# Patient Record
Sex: MALE | Race: AMERICAN INDIAN OR ALASKA NATIVE
[De-identification: names, ages, dates, MRNs, and addresses within clinical notes are randomized per-mention and may not be internally consistent; named-entity substitution may affect disease eponyms.]

---

## 2017-02-02 ENCOUNTER — HOSPITAL ENCOUNTER (INPATIENT)
Dept: HOSPITAL 5 - ED | Age: 77
LOS: 2 days | Discharge: HOME | DRG: 391 | End: 2017-02-04
Attending: HOSPITALIST | Admitting: INTERNAL MEDICINE
Payer: MEDICARE

## 2017-02-02 DIAGNOSIS — I25.10: ICD-10-CM

## 2017-02-02 DIAGNOSIS — E78.5: ICD-10-CM

## 2017-02-02 DIAGNOSIS — N17.0: ICD-10-CM

## 2017-02-02 DIAGNOSIS — E87.5: ICD-10-CM

## 2017-02-02 DIAGNOSIS — Z83.3: ICD-10-CM

## 2017-02-02 DIAGNOSIS — K52.9: Primary | ICD-10-CM

## 2017-02-02 DIAGNOSIS — H54.7: ICD-10-CM

## 2017-02-02 DIAGNOSIS — I10: ICD-10-CM

## 2017-02-02 DIAGNOSIS — Z95.1: ICD-10-CM

## 2017-02-02 DIAGNOSIS — Z82.49: ICD-10-CM

## 2017-02-02 DIAGNOSIS — Z86.73: ICD-10-CM

## 2017-02-02 DIAGNOSIS — E11.65: ICD-10-CM

## 2017-02-02 LAB
ALBUMIN SERPL-MCNC: 4.4 G/DL (ref 3.9–5)
ALBUMIN/GLOB SERPL: 1.5 %
ALP SERPL-CCNC: 49 UNITS/L (ref 35–129)
ALT SERPL-CCNC: 19 UNITS/L (ref 7–56)
ANION GAP SERPL CALC-SCNC: 21 MMOL/L
BASOPHILS NFR BLD AUTO: 0.5 % (ref 0–1.8)
BILIRUB DIRECT SERPL-MCNC: < 0.2 MG/DL (ref 0–0.2)
BILIRUB INDIRECT SERPL-MCNC: 0.6 MG/DL
BILIRUB SERPL-MCNC: 0.8 MG/DL (ref 0.1–1.2)
BILIRUB UR QL STRIP: (no result)
BLOOD UR QL VISUAL: (no result)
BUN SERPL-MCNC: 33 MG/DL (ref 9–20)
BUN/CREAT SERPL: 20.62 %
CALCIUM SERPL-MCNC: 9.4 MG/DL (ref 8.4–10.2)
CHLORIDE SERPL-SCNC: 98.4 MMOL/L (ref 98–107)
CO2 SERPL-SCNC: 24 MMOL/L (ref 22–30)
EOSINOPHIL NFR BLD AUTO: 0.1 % (ref 0–4.3)
GLUCOSE SERPL-MCNC: 207 MG/DL (ref 75–100)
HCT VFR BLD CALC: 45.6 % (ref 35.5–45.6)
HGB BLD-MCNC: 14.9 GM/DL (ref 11.8–15.2)
KETONES UR STRIP-MCNC: (no result) MG/DL
LEUKOCYTE ESTERASE UR QL STRIP: (no result)
LIPASE SERPL-CCNC: 39 UNITS/L (ref 13–60)
MCH RBC QN AUTO: 27 PG (ref 28–32)
MCHC RBC AUTO-ENTMCNC: 33 % (ref 32–34)
MCV RBC AUTO: 83 FL (ref 84–94)
MUCOUS THREADS #/AREA URNS HPF: (no result) /HPF
NITRITE UR QL STRIP: (no result)
PH UR STRIP: 5 [PH] (ref 5–7)
PLATELET # BLD: 183 K/MM3 (ref 140–440)
POTASSIUM SERPL-SCNC: 5.4 MMOL/L (ref 3.6–5)
PROT SERPL-MCNC: 7.4 G/DL (ref 6.3–8.2)
PROT UR STRIP-MCNC: (no result) MG/DL
RBC # BLD AUTO: 5.52 M/MM3 (ref 3.65–5.03)
RBC #/AREA URNS HPF: 1 /HPF (ref 0–6)
SODIUM SERPL-SCNC: 138 MMOL/L (ref 137–145)
UROBILINOGEN UR-MCNC: < 2 MG/DL (ref ?–2)
WBC # BLD AUTO: 9 K/MM3 (ref 4.5–11)
WBC #/AREA URNS HPF: 1 /HPF (ref 0–6)

## 2017-02-02 PROCEDURE — 83690 ASSAY OF LIPASE: CPT

## 2017-02-02 PROCEDURE — 85027 COMPLETE CBC AUTOMATED: CPT

## 2017-02-02 PROCEDURE — 96361 HYDRATE IV INFUSION ADD-ON: CPT

## 2017-02-02 PROCEDURE — 80074 ACUTE HEPATITIS PANEL: CPT

## 2017-02-02 PROCEDURE — 36415 COLL VENOUS BLD VENIPUNCTURE: CPT

## 2017-02-02 PROCEDURE — 80048 BASIC METABOLIC PNL TOTAL CA: CPT

## 2017-02-02 PROCEDURE — 96375 TX/PRO/DX INJ NEW DRUG ADDON: CPT

## 2017-02-02 PROCEDURE — 96374 THER/PROPH/DIAG INJ IV PUSH: CPT

## 2017-02-02 PROCEDURE — 74022 RADEX COMPL AQT ABD SERIES: CPT

## 2017-02-02 PROCEDURE — 85025 COMPLETE CBC W/AUTO DIFF WBC: CPT

## 2017-02-02 PROCEDURE — 82962 GLUCOSE BLOOD TEST: CPT

## 2017-02-02 PROCEDURE — 81001 URINALYSIS AUTO W/SCOPE: CPT

## 2017-02-02 RX ADMIN — METOPROLOL TARTRATE SCH MG: 25 TABLET, FILM COATED ORAL at 22:18

## 2017-02-02 RX ADMIN — INSULIN ASPART SCH: 100 INJECTION, SOLUTION INTRAVENOUS; SUBCUTANEOUS at 22:19
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
## 2017-02-02 NOTE — HISTORY AND PHYSICAL REPORT
History of Present Illness


Date of examination: 02/02/17


Date of admission: 


02/02/17 18:19





Chief complaint: 


Nausea vomiting diarrhea





History of present illness: 


Patient is a 76-year-old man with a history of coronary artery disease, CABG, 

type 2 diabetes mellitus, blindness, dyslipidemia, hypertension and CVA with 

right-sided deficits who presents with nausea, vomiting and diarrhea for 2 days 

associated with lower crampy dull achy intermittent nonradiating abdominal pain 

which are resolved now.








Past History


Past Medical History: other (as hpi)


Past Surgical History: CABG


Social history: full code.  denies: smoking, alcohol abuse, prescription drug 

abuse, IV drug use


Family history: diabetes (sister), hypertension





Medications and Allergies


 Allergies











Allergy/AdvReac Type Severity Reaction Status Date / Time


 


No Known Allergies Allergy   Verified 02/02/17 13:11











Active Meds: 


Active Medications





Acetaminophen (Tylenol)  650 mg PO Q6H PRN


   PRN Reason: Non Cardiac Pain or Temp>100.5


Dextrose (D50w (25gm))  50 ml IV PRN PRN


   PRN Reason: Hypoglycemia


Heparin Sodium (Porcine) (Heparin)  5,000 unit SUB-Q Q12HR BARBRA


Sodium Chloride (Nacl 0.9% 1000 Ml)   mls @ 125 mls/hr IV AS DIRECT BARBRA


Insulin Aspart (Novolog)  0 units SUB-Q ACHS BARBRA


   PRN Reason: Protocol


Metoprolol Tartrate (Lopressor)  25 mg PO BID BARBRA


Ondansetron HCl (Zofran)  4 mg IV Q4H PRN


   PRN Reason: Nausea And Vomiting


Pantoprazole Sodium (Protonix)  40 mg PO QDAY BARBRA











Review of Systems


All systems: negative (as HPI and all other ROS reviewed and negative.)





Exam





- Physical Exam


Narrative exam: 


GEN: WDWN, NAD, AWAKE, ALERT, ORIENTATED x 3


HEENT: NCAT, PERRL, EOMI, OP CLEAR


NECK: SUPPLE, NO THYROMEGALY, NO JVD, NO LAD


CVS: RRR, NORMAL S1S2


LUNGS/CHEST: CTA B, NORMAL CHEST EXPANSION B, GOOD AIR ENTRY B


ABD: SOFT NTND, GBS, NO REBOUND OR GUARDING


EXT/SKIN: NO SIGNIFICANT EDEMA OR RASH


MSK: FROM X 4 EXTREMITIES


NEURO: CN 2-12 GROSSLY INTACT except vision, NO new FOCAL DEFICITS


PSY: CALM








- Constitutional


Vitals: 


 











Temp Pulse Resp BP Pulse Ox


 


 98.1 F   73   17   145/71   98 


 


 02/02/17 10:21  02/02/17 16:31  02/02/17 16:31  02/02/17 16:31  02/02/17 16:31














Results





- Labs


CBC & Chem 7: 


 02/02/17 10:52





 02/02/17 10:52





Assessment and Plan


Patient is a 76-year-old man with a history of coronary artery disease, CABG, 

type 2 diabetes mellitus, blindness, dyslipidemia, hypertension and CVA with 

right-sided deficits who presents with nausea, vomiting and diarrhea for 2 days 

associated with lower crampy dull achy intermittent nonradiating abdominal pain 

which are resolved now.





1. ARF, vasomotor nephropathy: ivf, recheck in AM


2. AGE: supportative care, ivf, antiemetics


3. Mild hyperkalemia, given kayexalate: repeat AM


4. Uncontrolled type 2 DM: add ssi

## 2017-02-02 NOTE — ADMIT CRITERIA FORM
Admission Criteria Documentation: 





                                                                         


                           ABDOMINAL PAIN





Clinical Indications for Admission to Inpatient Care


 


                                                                               

      (Place 'X' for any and all applicable criteria):





Admission is indicated for ANY ONE of the following(1)(2)(3)(4)(5):


[X ]I.      Inpatient admission required rather than observation care (Also use 

Abdominal Pain: Observation Care, 


           as appropriate) because of ANY ONE of the following:


            [ ]a)   Severe pain requiring acute inpatient management


            [ ]b)    Identification of etiology/finding that requires inpatient 

care (eg, aortic dissection, free air)


            [ ]c)    Absent bowel sounds with complete ileus(6)  


            [ ]d)    Suspected toxic megacolon


            [ ]e)    Severe electrolyte abnormalities requiring inpatient care


            [ ]f)     High fever or infection requiring inpatient admission as 

indicated by ANY ONE of following(7)(8):


                       [ ] i)    Appropriate outpatient or observational care 

antimicrobial treatment unavailable, not effective, or not feasible


                       [ ] ii)   Documented bacteremia 


                       [ ] iii)  Temperature > 104.9 degrees F (oral)


                       [ ] iv)  T >103.1 F (oral) or < 96.8 F(rectal) that does 

not respond to all emergency treatment measures


            [ ]g)     Signs of intestinal obstruction [B] 


            [ ]h)     Hemodynamic instability


            [ ]i)      IV fluid to replace significant ongoing losses (greater 

than 3 L/m2 per day) (12)(13)


            [ ]j)      Percutaneous or open drainage (eg, abscess, biliary tract

) procedures


            [ ]k)     Parenteral nutrition regimen that must be implemented on 

inpatient basis   


            [X ]l)      Other condition,treatment or monitoring requiring 

inpatient admission.


[ ]II.      Peritoneal signs present


[ ]III.     Surgery needed that cannot be performed on an ambulatory basis.


[ ]IV.    Evaluation requires patient to not eat or drink for extended period (

eg, more than 24 hours).





[ ]V.     Contraindications and/or Inappropriate clinical situations for 

Observational Care in patients with


           abdominal pain, when ANY ONE of the following is required:


           [ ]a)  Thorough evaluation is required to prevent catastrophic 

events due to delays in diagnosing  


                   (e.g.Mesenteric ischemia) 1,3


           [ ]b)  Patient with severe pathology or with chronic symptoms 

unlikely to improve in the ED stay (3)


[ X]VI.    General contraindications and/or Inappropriate clinical situations 

for Observational Care in patients


           with abdominal pain, when ANY ONE of the following is required:


           [ X]a)   Prediction of prolongation of LOS based on ANY ONE of the 

following may be considered as 


                    a contraindication for observational care 2, 3, 4, 5, 6, 7, 

8, 9, 10, 11


                    [ X]i)    Age > 65 yrs.


                    [ ]ii)   Patient arriving by ambulance


                    [ ]iii)  Patient with high acuity


                    [ ]iv)  Patient requiring vital sign monitoring


                    [ ]v)   Patient on IV medication


           [ ]b)   Systolic blood pressures  180mmHg 3,12


           [ ]c)   Patient with altered mental status including delirium and 

other alteration of consciousness, (3)


           [ ]d)   Patient whose discharge disposition will be to a skilled 

nursing home or rehabilitation home should 


                    not be managed in Emergency Department Observation Unit. 

CMS rule requires 3 days hospital stay before such placement.3,13


           [ ]e)   Patient with failure to thrive due to broad array of 

etiologies 3,16,17


           [ ]f)    Inability to ambulate 3,14








Extended stay beyond goal length of stay may be needed for(2)(3):


[ ]a)   Persistent abdominal pain with suspected intra-abdominal process


[ ]b)   Diagnosed condition requiring continued stay (e.g., pancreatitis, 

complicated diverticulitis)                                            


[ ]c)   Surgery (e.g., colectomy)                


    





The original [facility] content created by [employer] has been revised. 


The portions of the content which have been revised are identified through the 

use of italic text or in bold, and [facility] [employer] 


has neither reviewed nor approved the modified material.All other unmodified 

content is copyright  [facility].





Please see references footnoted in the original [facility] edition 

2016


Admission Criteria Met: Yes

## 2017-02-03 LAB
ANION GAP SERPL CALC-SCNC: 18 MMOL/L
BUN SERPL-MCNC: 26 MG/DL (ref 9–20)
BUN/CREAT SERPL: 18.57 %
CALCIUM SERPL-MCNC: 9.1 MG/DL (ref 8.4–10.2)
CHLORIDE SERPL-SCNC: 99.1 MMOL/L (ref 98–107)
CO2 SERPL-SCNC: 25 MMOL/L (ref 22–30)
GLUCOSE SERPL-MCNC: 173 MG/DL (ref 75–100)
HCT VFR BLD CALC: 45.5 % (ref 35.5–45.6)
HGB BLD-MCNC: 14.9 GM/DL (ref 11.8–15.2)
MCH RBC QN AUTO: 27 PG (ref 28–32)
MCHC RBC AUTO-ENTMCNC: 33 % (ref 32–34)
MCV RBC AUTO: 84 FL (ref 84–94)
PLATELET # BLD: 191 K/MM3 (ref 140–440)
POTASSIUM SERPL-SCNC: 4.4 MMOL/L (ref 3.6–5)
RBC # BLD AUTO: 5.44 M/MM3 (ref 3.65–5.03)
SODIUM SERPL-SCNC: 138 MMOL/L (ref 137–145)
WBC # BLD AUTO: 8.5 K/MM3 (ref 4.5–11)

## 2017-02-03 RX ADMIN — ONDANSETRON PRN MG: 2 INJECTION INTRAMUSCULAR; INTRAVENOUS at 04:04

## 2017-02-03 RX ADMIN — INSULIN ASPART SCH: 100 INJECTION, SOLUTION INTRAVENOUS; SUBCUTANEOUS at 08:06

## 2017-02-03 RX ADMIN — METOPROLOL TARTRATE SCH MG: 25 TABLET, FILM COATED ORAL at 10:11

## 2017-02-03 RX ADMIN — PANTOPRAZOLE SODIUM SCH MG: 40 TABLET, DELAYED RELEASE ORAL at 10:09

## 2017-02-03 RX ADMIN — METOPROLOL TARTRATE SCH MG: 25 TABLET, FILM COATED ORAL at 21:46

## 2017-02-03 RX ADMIN — ONDANSETRON PRN MG: 2 INJECTION INTRAMUSCULAR; INTRAVENOUS at 10:09

## 2017-02-03 RX ADMIN — INSULIN ASPART SCH: 100 INJECTION, SOLUTION INTRAVENOUS; SUBCUTANEOUS at 17:27

## 2017-02-03 RX ADMIN — INSULIN ASPART SCH: 100 INJECTION, SOLUTION INTRAVENOUS; SUBCUTANEOUS at 21:53

## 2017-02-03 RX ADMIN — INSULIN ASPART SCH UNITS: 100 INJECTION, SOLUTION INTRAVENOUS; SUBCUTANEOUS at 12:57

## 2017-02-03 RX ADMIN — SODIUM CHLORIDE SCH MLS/HR: 0.9 INJECTION, SOLUTION INTRAVENOUS at 10:12

## 2017-02-03 RX ADMIN — HEPARIN SODIUM SCH UNIT: 5000 INJECTION, SOLUTION INTRAVENOUS; SUBCUTANEOUS at 21:47

## 2017-02-03 NOTE — PROGRESS NOTE
Assessment and Plan


Assessment and plan: 





1. ARF, vasomotor nephropathy-due to volume loss from gastroenteritis.  

Improving, Continue IV fluids.  Monitor electrolytes and renal function.  IV 

antiemetics


2.  Mild hyperkalemia-now resolved.  We'll continue to monitor, 


3. Uncontrolled type 2 DM- glucose control is improving, continue sliding scale


4.  Visual impairment-supportive care


5. DVT prophylaxis-heparin


Possible discharge in the morning








History


Interval history: 


Follow-up for acute renal failure, gastroenteritis


Patient seen on the bedside, continues to vomit.








Hospitalist Physical





- Constitutional


Vitals: 


 











Temp Pulse Resp BP Pulse Ox


 


 98.2 F   67   22   194/88   98 


 


 02/03/17 09:12  02/03/17 09:12  02/03/17 09:12  02/03/17 09:12  02/03/17 09:12











General appearance: Present: no acute distress, well-nourished





- EENT


Eyes: Present: PERRL, EOM intact.  Absent: scleral icterus, conjunctival 

injection


ENT: hearing intact, clear oral mucosa, no oropharyngeal erythema, no poor 

dentition





- Neck


Neck: Present: supple, normal ROM.  Absent: enlarged thyroid, masses or JVD





- Respiratory


Respiratory effort: normal


Respiratory: bilateral: diminished, negative: rales, rhonchi, wheezing





- Cardiovascular


Rhythm: regular


Heart Sounds: Present: S1 & S2.  Absent: gallop





- Extremities


Extremities: no ischemia, pulses intact, pulses symmetrical, No edema, normal 

temperature


Peripheral Pulses: within normal limits





- Abdominal


General gastrointestinal: soft, non-tender, non-distended





- Integumentary


Integumentary: Present: clear





- Psychiatric


Psychiatric: appropriate mood/affect, intact judgment & insight





- Neurologic


Neurologic: CNII-XII intact, moves all extremities





Results





- Labs


CBC & Chem 7: 


 02/03/17 04:54





 02/03/17 04:54


Labs: 


 Laboratory Last Values











WBC  8.5 K/mm3 (4.5-11.0)   02/03/17  04:54    


 


RBC  5.44 M/mm3 (3.65-5.03)  H  02/03/17  04:54    


 


Hgb  14.9 gm/dl (11.8-15.2)   02/03/17  04:54    


 


Hct  45.5 % (35.5-45.6)   02/03/17  04:54    


 


MCV  84 fl (84-94)   02/03/17  04:54    


 


MCH  27 pg (28-32)  L  02/03/17  04:54    


 


MCHC  33 % (32-34)   02/03/17  04:54    


 


RDW  15.8 % (13.2-15.2)  H  02/03/17  04:54    


 


Plt Count  191 K/mm3 (140-440)   02/03/17  04:54    


 


Lymph % (Auto)  11.4 % (13.4-35.0)  L  02/02/17  10:52    


 


Mono % (Auto)  3.8 % (0.0-7.3)   02/02/17  10:52    


 


Eos % (Auto)  0.1 % (0.0-4.3)   02/02/17  10:52    


 


Baso % (Auto)  0.5 % (0.0-1.8)   02/02/17  10:52    


 


Lymph #  1.0 K/mm3 (1.2-5.4)  L  02/02/17  10:52    


 


Mono #  0.3 K/mm3 (0.0-0.8)   02/02/17  10:52    


 


Eos #  0.0 K/mm3 (0.0-0.4)   02/02/17  10:52    


 


Baso #  0.0 K/mm3 (0.0-0.1)   02/02/17  10:52    


 


Seg Neutrophils %  84.2 % (40.0-70.0)  H  02/02/17  10:52    


 


Seg Neutrophils #  7.6 K/mm3 (1.8-7.7)   02/02/17  10:52    


 


Sodium  138 mmol/L (137-145)   02/03/17  04:54    


 


Potassium  4.4 mmol/L (3.6-5.0)   02/03/17  04:54    


 


Chloride  99.1 mmol/L ()   02/03/17  04:54    


 


Carbon Dioxide  25 mmol/L (22-30)   02/03/17  04:54    


 


Anion Gap  18 mmol/L  02/03/17  04:54    


 


BUN  26 mg/dL (9-20)  H  02/03/17  04:54    


 


Creatinine  1.4 mg/dL (0.8-1.5)   02/03/17  04:54    


 


Estimated GFR  60 ml/min  02/03/17  04:54    


 


BUN/Creatinine Ratio  18.57 %  02/03/17  04:54    


 


Glucose  173 mg/dL ()  H  02/03/17  04:54    


 


POC Glucose  196  ()  H  02/03/17  06:52    


 


Calcium  9.1 mg/dL (8.4-10.2)   02/03/17  04:54    


 


Total Bilirubin  0.8 mg/dL (0.1-1.2)   02/02/17  10:52    


 


Direct Bilirubin  < 0.2 mg/dL (0-0.2)   02/02/17  10:52    


 


Indirect Bilirubin  0.6 mg/dL  02/02/17  10:52    


 


AST  24 units/L (5-40)   02/02/17  10:52    


 


ALT  19 units/L (7-56)   02/02/17  10:52    


 


Alkaline Phosphatase  49 units/L ()   02/02/17  10:52    


 


Total Protein  7.4 g/dL (6.3-8.2)   02/02/17  10:52    


 


Albumin  4.4 g/dL (3.9-5)   02/02/17  10:52    


 


Albumin/Globulin Ratio  1.5 %  02/02/17  10:52    


 


Lipase  39 units/L (13-60)   02/02/17  10:52    


 


Urine Color  Yellow  (Yellow)   02/02/17  11:18    


 


Urine Turbidity  Clear  (Clear)   02/02/17  11:18    


 


Urine pH  5.0  (5.0-7.0)   02/02/17  11:18    


 


Ur Specific Gravity  1.019  (1.003-1.030)   02/02/17  11:18    


 


Urine Protein  <15 mg/dl mg/dL (Negative)   02/02/17  11:18    


 


Urine Glucose (UA)  50 mg/dL (Negative)   02/02/17  11:18    


 


Urine Ketones  Neg mg/dL (Negative)   02/02/17  11:18    


 


Urine Blood  Sm  (Negative)   02/02/17  11:18    


 


Urine Nitrite  Neg  (Negative)   02/02/17  11:18    


 


Urine Bilirubin  Neg  (Negative)   02/02/17  11:18    


 


Urine Urobilinogen  < 2.0 mg/dL (<2.0)   02/02/17  11:18    


 


Ur Leukocyte Esterase  Neg  (Negative)   02/02/17  11:18    


 


Urine WBC (Auto)  1.0 /HPF (0.0-6.0)   02/02/17  11:18    


 


Urine RBC (Auto)  1.0 /HPF (0.0-6.0)   02/02/17  11:18    


 


Urine Mucus  Few /HPF  02/02/17  11:18

## 2017-02-04 VITALS — SYSTOLIC BLOOD PRESSURE: 156 MMHG | DIASTOLIC BLOOD PRESSURE: 74 MMHG

## 2017-02-04 LAB
ANION GAP SERPL CALC-SCNC: 22 MMOL/L
BUN SERPL-MCNC: 19 MG/DL (ref 9–20)
BUN/CREAT SERPL: 17.27 %
CALCIUM SERPL-MCNC: 9.4 MG/DL (ref 8.4–10.2)
CHLORIDE SERPL-SCNC: 95.5 MMOL/L (ref 98–107)
CO2 SERPL-SCNC: 25 MMOL/L (ref 22–30)
GLUCOSE SERPL-MCNC: 135 MG/DL (ref 75–100)
POTASSIUM SERPL-SCNC: 4.1 MMOL/L (ref 3.6–5)
SODIUM SERPL-SCNC: 138 MMOL/L (ref 137–145)

## 2017-02-04 RX ADMIN — HEPARIN SODIUM SCH UNIT: 5000 INJECTION, SOLUTION INTRAVENOUS; SUBCUTANEOUS at 09:39

## 2017-02-04 RX ADMIN — HYDRALAZINE HYDROCHLORIDE PRN MG: 20 INJECTION INTRAMUSCULAR; INTRAVENOUS at 08:02

## 2017-02-04 RX ADMIN — HYDRALAZINE HYDROCHLORIDE PRN MG: 20 INJECTION INTRAMUSCULAR; INTRAVENOUS at 12:47

## 2017-02-04 RX ADMIN — PANTOPRAZOLE SODIUM SCH MG: 40 TABLET, DELAYED RELEASE ORAL at 09:39

## 2017-02-04 RX ADMIN — INSULIN ASPART SCH: 100 INJECTION, SOLUTION INTRAVENOUS; SUBCUTANEOUS at 08:50

## 2017-02-04 RX ADMIN — SODIUM CHLORIDE SCH MLS/HR: 0.9 INJECTION, SOLUTION INTRAVENOUS at 02:46

## 2017-02-04 RX ADMIN — INSULIN ASPART SCH: 100 INJECTION, SOLUTION INTRAVENOUS; SUBCUTANEOUS at 12:41

## 2017-02-04 RX ADMIN — METOPROLOL TARTRATE SCH MG: 25 TABLET, FILM COATED ORAL at 09:40

## 2017-02-04 NOTE — DISCHARGE SUMMARY
Providers





- Providers


Date of Admission: 


02/02/17 18:19





Date of discharge: 02/04/17


Attending physician: 


YANN ROGERS





Primary care physician: 


PRIMARY CARE MD








Hospitalization


Reason for admission: gastroenteritis


Condition: Fair


Pertinent studies: 


cxr- no acute abn





Hospital course: 


Mr Wills is a 74 yo M who presented to the ER with vomiting and diarrhea due to 

gastroenteritis; he also had complained of cramping abdominal pain prior to 

being seen in the emergency room.  His symptoms resolved,  he also had acute 

renal failure due to vasomotor nephropathy which resolved prior to discharge.


Condition at discharge- stable


31 minutes spent preparing discharge 


Disposition: DISCHARGED TO HOME OR SELFCARE





- Discharge Diagnoses


(1) Gastroenteritis


Status: Acute   





(2) Abdominal pain, acute, generalized


Status: Acute   





(3) Acute hyperkalemia


Status: Acute   





(4) Acute renal failure (ARF)


Status: Acute   


Qualifiers: 


   Acute renal failure type: unspecified   Qualified Code(s): N17.9 - Acute 

kidney failure, unspecified   





Core Measure Documentation





- Palliative Care


Palliative Care/ Comfort Measures: Not Applicable





- Core Measures


Any of the following diagnoses?: none





Exam





- Constitutional


Vitals: 


 











Temp Pulse Resp BP Pulse Ox


 


 98.6 F   69   20   150/90   96 


 


 02/04/17 07:55  02/04/17 09:40  02/04/17 09:20  02/04/17 09:40  02/04/17 07:55











General appearance: Present: no acute distress





- EENT


Eyes: Present: PERRL, EOM intact.  Absent: scleral icterus, conjunctival 

injection


ENT: hearing intact, clear oral mucosa, no oropharyngeal erythema, no poor 

dentition





- Neck


Neck: Present: supple, normal ROM.  Absent: enlarged thyroid, masses or JVD





- Respiratory


Respiratory effort: normal


Respiratory: negative: diminished, rales, rhonchi, wheezing





- Cardiovascular


Rhythm: regular


Heart Sounds: Present: S1 & S2.  Absent: gallop





- Extremities


Extremities: no ischemia, pulses intact, pulses symmetrical, No edema


Peripheral Pulses: within normal limits





- Abdominal


General gastrointestinal: Present: soft, non-tender, non-distended


Male genitourinary: Present: deferred





- Rectal


Rectal Exam: deferred





- Integumentary


Integumentary: Present: clear





- Musculoskeletal


Musculoskeletal: strength equal bilaterally





- Psychiatric


Psychiatric: appropriate mood/affect, intact judgment & insight, cooperative





- Neurologic


Neurologic: CNII-XII intact, moves all extremities





Plan


Activity: advance as tolerated, fall precautions


Diet: low salt


Follow up with: 


PRIMARY CARE,MD [Primary Care Provider] - 3-5 Days


Prescriptions: 


Metoprolol [Lopressor TAB] 25 mg PO BID #60 tablet


Ondansetron [Zofran TAB] 4 mg PO Q8HR PRN #15 tablet


 PRN Reason: nausea or vomitting

## 2017-05-21 ENCOUNTER — HOSPITAL ENCOUNTER (EMERGENCY)
Dept: HOSPITAL 5 - ED | Age: 77
Discharge: HOME | End: 2017-05-21
Payer: MEDICARE

## 2017-05-21 VITALS — DIASTOLIC BLOOD PRESSURE: 68 MMHG | SYSTOLIC BLOOD PRESSURE: 141 MMHG

## 2017-05-21 DIAGNOSIS — I10: ICD-10-CM

## 2017-05-21 DIAGNOSIS — Z86.73: ICD-10-CM

## 2017-05-21 DIAGNOSIS — E78.00: ICD-10-CM

## 2017-05-21 DIAGNOSIS — E11.649: Primary | ICD-10-CM

## 2017-05-21 LAB
ANION GAP SERPL CALC-SCNC: 15 MMOL/L
BASOPHILS NFR BLD AUTO: 1.4 % (ref 0–1.8)
BILIRUB UR QL STRIP: (no result)
BLOOD UR QL VISUAL: (no result)
BUN SERPL-MCNC: 25 MG/DL (ref 9–20)
BUN/CREAT SERPL: 19.23 %
CALCIUM SERPL-MCNC: 9.3 MG/DL (ref 8.4–10.2)
CHLORIDE SERPL-SCNC: 100 MMOL/L (ref 98–107)
CO2 SERPL-SCNC: 28 MMOL/L (ref 22–30)
EOSINOPHIL NFR BLD AUTO: 0.7 % (ref 0–4.3)
GLUCOSE SERPL-MCNC: 76 MG/DL (ref 75–100)
HCT VFR BLD CALC: 46.3 % (ref 35.5–45.6)
HGB BLD-MCNC: 15.1 GM/DL (ref 11.8–15.2)
INR PPP: 1.04 (ref 0.87–1.13)
KETONES UR STRIP-MCNC: (no result) MG/DL
LEUKOCYTE ESTERASE UR QL STRIP: (no result)
MCH RBC QN AUTO: 27 PG (ref 28–32)
MCHC RBC AUTO-ENTMCNC: 33 % (ref 32–34)
MCV RBC AUTO: 83 FL (ref 84–94)
MUCOUS THREADS #/AREA URNS HPF: (no result) /HPF
NITRITE UR QL STRIP: (no result)
PH UR STRIP: 5 [PH] (ref 5–7)
PLATELET # BLD: 177 K/MM3 (ref 140–440)
POTASSIUM SERPL-SCNC: 3.9 MMOL/L (ref 3.6–5)
PROT UR STRIP-MCNC: (no result) MG/DL
RBC # BLD AUTO: 5.56 M/MM3 (ref 3.65–5.03)
RBC #/AREA URNS HPF: 3 /HPF (ref 0–6)
SODIUM SERPL-SCNC: 139 MMOL/L (ref 137–145)
UROBILINOGEN UR-MCNC: < 2 MG/DL (ref ?–2)
WBC # BLD AUTO: 6.6 K/MM3 (ref 4.5–11)
WBC #/AREA URNS HPF: < 1 /HPF (ref 0–6)

## 2017-05-21 PROCEDURE — 81001 URINALYSIS AUTO W/SCOPE: CPT

## 2017-05-21 PROCEDURE — 83735 ASSAY OF MAGNESIUM: CPT

## 2017-05-21 PROCEDURE — 72125 CT NECK SPINE W/O DYE: CPT

## 2017-05-21 PROCEDURE — 99285 EMERGENCY DEPT VISIT HI MDM: CPT

## 2017-05-21 PROCEDURE — 85610 PROTHROMBIN TIME: CPT

## 2017-05-21 PROCEDURE — 93005 ELECTROCARDIOGRAM TRACING: CPT

## 2017-05-21 PROCEDURE — 70450 CT HEAD/BRAIN W/O DYE: CPT

## 2017-05-21 PROCEDURE — 71020: CPT

## 2017-05-21 PROCEDURE — 85025 COMPLETE CBC W/AUTO DIFF WBC: CPT

## 2017-05-21 PROCEDURE — 80048 BASIC METABOLIC PNL TOTAL CA: CPT

## 2017-05-21 PROCEDURE — 93010 ELECTROCARDIOGRAM REPORT: CPT

## 2017-05-21 PROCEDURE — 82962 GLUCOSE BLOOD TEST: CPT

## 2017-05-21 PROCEDURE — 36415 COLL VENOUS BLD VENIPUNCTURE: CPT

## 2017-05-21 PROCEDURE — 96374 THER/PROPH/DIAG INJ IV PUSH: CPT

## 2017-05-21 PROCEDURE — 87086 URINE CULTURE/COLONY COUNT: CPT

## 2017-05-21 NOTE — CAT SCAN REPORT
CT CERVICAL SPINE WITHOUT CONTRAST:05/21/17 11:50:00



CLINICAL: Altered mental status.



TECHNIQUE: Volumetric acquisition and 1.25-mm scan reconstructions 

without contrast.  Sagittal and coronal reformats were performed.



FINDINGS: Moderate degenerative disc disease from C3-4 through C7-T1.  

No fracture or subluxation.   Normal odontoid and C1.  Normal soft 

tissues and airway.  No apparent disc protrusions or bulges.  However, 

multilevel neural foraminal narrowing secondary to osteophytes.



IMPRESSION: Multilevel degenerative disease.  No evidence of traumatic 

injury.
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
## 2019-02-28 ENCOUNTER — HOSPITAL ENCOUNTER (EMERGENCY)
Dept: HOSPITAL 5 - ED | Age: 79
Discharge: HOME | End: 2019-02-28
Payer: MEDICARE

## 2019-02-28 VITALS — SYSTOLIC BLOOD PRESSURE: 145 MMHG | DIASTOLIC BLOOD PRESSURE: 81 MMHG

## 2019-02-28 DIAGNOSIS — E78.00: ICD-10-CM

## 2019-02-28 DIAGNOSIS — Z79.4: ICD-10-CM

## 2019-02-28 DIAGNOSIS — Z86.73: ICD-10-CM

## 2019-02-28 DIAGNOSIS — L03.112: Primary | ICD-10-CM

## 2019-02-28 DIAGNOSIS — I10: ICD-10-CM

## 2019-02-28 DIAGNOSIS — L03.111: ICD-10-CM

## 2019-02-28 DIAGNOSIS — E11.9: ICD-10-CM

## 2019-02-28 LAB
ALBUMIN SERPL-MCNC: 4.6 G/DL (ref 3.9–5)
ALT SERPL-CCNC: 16 UNITS/L (ref 7–56)
BASOPHILS # (AUTO): 0.1 K/MM3 (ref 0–0.1)
BASOPHILS NFR BLD AUTO: 1.2 % (ref 0–1.8)
BUN SERPL-MCNC: 17 MG/DL (ref 9–20)
BUN/CREAT SERPL: 15 %
CALCIUM SERPL-MCNC: 9.8 MG/DL (ref 8.4–10.2)
EOSINOPHIL # BLD AUTO: 0.2 K/MM3 (ref 0–0.4)
EOSINOPHIL NFR BLD AUTO: 3.3 % (ref 0–4.3)
HCT VFR BLD CALC: 47.3 % (ref 35.5–45.6)
HEMOLYSIS INDEX: 13
HGB BLD-MCNC: 15.7 GM/DL (ref 11.8–15.2)
LYMPHOCYTES # BLD AUTO: 1.9 K/MM3 (ref 1.2–5.4)
LYMPHOCYTES NFR BLD AUTO: 35.1 % (ref 13.4–35)
MCHC RBC AUTO-ENTMCNC: 33 % (ref 32–34)
MCV RBC AUTO: 85 FL (ref 84–94)
MONOCYTES # (AUTO): 0.5 K/MM3 (ref 0–0.8)
MONOCYTES % (AUTO): 9.7 % (ref 0–7.3)
PLATELET # BLD: 195 K/MM3 (ref 140–440)
RBC # BLD AUTO: 5.59 M/MM3 (ref 3.65–5.03)

## 2019-02-28 PROCEDURE — 99283 EMERGENCY DEPT VISIT LOW MDM: CPT

## 2019-02-28 PROCEDURE — 80053 COMPREHEN METABOLIC PANEL: CPT

## 2019-02-28 PROCEDURE — 85025 COMPLETE CBC W/AUTO DIFF WBC: CPT

## 2019-02-28 PROCEDURE — 36415 COLL VENOUS BLD VENIPUNCTURE: CPT
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
## 2019-02-28 NOTE — EMERGENCY DEPARTMENT REPORT
Blank Doc





- Documentation


Documentation: 





c/o bilateral axilla  pain time 3-4 days with discharge  HX/o Dm, HTN, Blindne

ss. 





This initial assessment diagnostic orders/clinical plan/treatment (s) is/Are 

subject change based on patient's health status, clinical progression and re-

assessment by fellow clinical providers in the ED. Further treatment and work-up

at subsequent clinical providers discretion.  Patient/guardians urged not to 

elope from their condition may be serious if not clinically assessed and 

managed.  Initial order include:

## 2021-10-12 ENCOUNTER — HOSPITAL ENCOUNTER (OUTPATIENT)
Dept: HOSPITAL 5 - ED | Age: 81
Setting detail: OBSERVATION
LOS: 2 days | Discharge: HOME | End: 2021-10-14
Attending: INTERNAL MEDICINE | Admitting: INTERNAL MEDICINE
Payer: MEDICARE

## 2021-10-12 DIAGNOSIS — Z95.1: ICD-10-CM

## 2021-10-12 DIAGNOSIS — Z79.4: ICD-10-CM

## 2021-10-12 DIAGNOSIS — R07.89: ICD-10-CM

## 2021-10-12 DIAGNOSIS — I25.10: ICD-10-CM

## 2021-10-12 DIAGNOSIS — I63.9: ICD-10-CM

## 2021-10-12 DIAGNOSIS — K80.20: ICD-10-CM

## 2021-10-12 DIAGNOSIS — H54.7: ICD-10-CM

## 2021-10-12 DIAGNOSIS — R10.84: ICD-10-CM

## 2021-10-12 DIAGNOSIS — E78.5: ICD-10-CM

## 2021-10-12 DIAGNOSIS — I73.9: ICD-10-CM

## 2021-10-12 DIAGNOSIS — Z86.73: ICD-10-CM

## 2021-10-12 DIAGNOSIS — E78.00: ICD-10-CM

## 2021-10-12 DIAGNOSIS — I16.0: Primary | ICD-10-CM

## 2021-10-12 LAB
ALBUMIN SERPL-MCNC: 4.4 G/DL (ref 3.9–5)
ALT SERPL-CCNC: 19 UNITS/L (ref 7–56)
BAND NEUTROPHILS # (MANUAL): 0 K/MM3
BILIRUB UR QL STRIP: (no result)
BLOOD UR QL VISUAL: (no result)
BUN SERPL-MCNC: 13 MG/DL (ref 9–20)
BUN/CREAT SERPL: 14 %
CALCIUM SERPL-MCNC: 9.4 MG/DL (ref 8.4–10.2)
HCT VFR BLD CALC: 44.5 % (ref 35.5–45.6)
HEMOLYSIS INDEX: 18
HGB BLD-MCNC: 14.8 GM/DL (ref 11.8–15.2)
INR PPP: 1 (ref 0.87–1.13)
MCHC RBC AUTO-ENTMCNC: 33 % (ref 32–34)
MCV RBC AUTO: 86 FL (ref 84–94)
MUCOUS THREADS #/AREA URNS HPF: (no result) /HPF
MYELOCYTES # (MANUAL): 0 K/MM3
PH UR STRIP: 7 [PH] (ref 5–7)
PLATELET # BLD: 180 K/MM3 (ref 140–440)
PROMYELOCYTES # (MANUAL): 0 K/MM3
PROT UR STRIP-MCNC: >500 MG/DL
RBC # BLD AUTO: 5.19 M/MM3 (ref 3.65–5.03)
RBC #/AREA URNS HPF: 7 /HPF (ref 0–6)
TOTAL CELLS COUNTED BLD: 100
UROBILINOGEN UR-MCNC: < 2 MG/DL (ref ?–2)
WBC #/AREA URNS HPF: 2 /HPF (ref 0–6)

## 2021-10-12 PROCEDURE — 71275 CT ANGIOGRAPHY CHEST: CPT

## 2021-10-12 PROCEDURE — 96361 HYDRATE IV INFUSION ADD-ON: CPT

## 2021-10-12 PROCEDURE — 84443 ASSAY THYROID STIM HORMONE: CPT

## 2021-10-12 PROCEDURE — 82550 ASSAY OF CK (CPK): CPT

## 2021-10-12 PROCEDURE — 85610 PROTHROMBIN TIME: CPT

## 2021-10-12 PROCEDURE — 84484 ASSAY OF TROPONIN QUANT: CPT

## 2021-10-12 PROCEDURE — 83735 ASSAY OF MAGNESIUM: CPT

## 2021-10-12 PROCEDURE — 96376 TX/PRO/DX INJ SAME DRUG ADON: CPT

## 2021-10-12 PROCEDURE — A9537 TC99M MEBROFENIN: HCPCS

## 2021-10-12 PROCEDURE — 93005 ELECTROCARDIOGRAM TRACING: CPT

## 2021-10-12 PROCEDURE — 82140 ASSAY OF AMMONIA: CPT

## 2021-10-12 PROCEDURE — 85379 FIBRIN DEGRADATION QUANT: CPT

## 2021-10-12 PROCEDURE — 83690 ASSAY OF LIPASE: CPT

## 2021-10-12 PROCEDURE — 96374 THER/PROPH/DIAG INJ IV PUSH: CPT

## 2021-10-12 PROCEDURE — 76705 ECHO EXAM OF ABDOMEN: CPT

## 2021-10-12 PROCEDURE — 74177 CT ABD & PELVIS W/CONTRAST: CPT

## 2021-10-12 PROCEDURE — 82962 GLUCOSE BLOOD TEST: CPT

## 2021-10-12 PROCEDURE — 96372 THER/PROPH/DIAG INJ SC/IM: CPT

## 2021-10-12 PROCEDURE — 80053 COMPREHEN METABOLIC PANEL: CPT

## 2021-10-12 PROCEDURE — 99285 EMERGENCY DEPT VISIT HI MDM: CPT

## 2021-10-12 PROCEDURE — 85007 BL SMEAR W/DIFF WBC COUNT: CPT

## 2021-10-12 PROCEDURE — G0378 HOSPITAL OBSERVATION PER HR: HCPCS

## 2021-10-12 PROCEDURE — 36415 COLL VENOUS BLD VENIPUNCTURE: CPT

## 2021-10-12 PROCEDURE — 80048 BASIC METABOLIC PNL TOTAL CA: CPT

## 2021-10-12 PROCEDURE — 90686 IIV4 VACC NO PRSV 0.5 ML IM: CPT

## 2021-10-12 PROCEDURE — 96375 TX/PRO/DX INJ NEW DRUG ADDON: CPT

## 2021-10-12 PROCEDURE — 85025 COMPLETE CBC W/AUTO DIFF WBC: CPT

## 2021-10-12 PROCEDURE — 81001 URINALYSIS AUTO W/SCOPE: CPT

## 2021-10-12 PROCEDURE — 78227 HEPATOBIL SYST IMAGE W/DRUG: CPT

## 2021-10-12 PROCEDURE — 71046 X-RAY EXAM CHEST 2 VIEWS: CPT

## 2021-10-12 NOTE — CAT SCAN REPORT
CT ABDOMEN AND PELVIS WITH CONTRAST



INDICATION:

Right flank abdominal pain, nausea and vomit.



TECHNIQUE:

Axial CT images were obtained through the abdomen and pelvis after IV contrast.  All CT scans at this
 location are performed using CT dose reduction for ALARA by means of automated exposure control. 



COMPARISON:

None available.



FINDINGS:

LOWER CHEST: Linear bibasilar streaky parenchymal disease characteristic for atelectasis

LIVER: No significant abnormality.

GALLBLADDER: Numerous layering calcified gallstones without CT evidence for cholecystitis.  

BILE DUCTS: No significant abnormality.

PANCREAS: No significant abnormality.

SPLEEN: No significant abnormality.

ADRENALS: No significant abnormality.

RIGHT KIDNEY and URETER: No significant abnormality.

LEFT KIDNEY and URETER: No significant abnormality.



STOMACH and SMALL BOWEL: No significant abnormality. 

COLON: No significant abnormality. 

APPENDIX: Normal.  

PERITONEUM: No free fluid. No free air. No fluid collection.

LYMPH NODES: No significant adenopathy.

AORTA and ARTERIES: Severe atherosclerotic vascular calcifications with occlusion right common iliac 
artery and probable occlusions of right common femoral and both proximal superficial femoral arteries
 

IVC and VEINS: No significant abnormality.



URINARY BLADDER: No significant abnormality.

REPRODUCTIVE ORGANS: No significant abnormality.



ADDITIONAL FINDINGS: None.



SKELETAL SYSTEM: Moderately advanced degenerative changes lumbar spine



IMPRESSION:

1. 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
ght common femoral and both proximal superficial femoral arteries

2. Cholelithiasis



Signer Name: Tony Olivo MD 

Signed: 10/12/2021 11:28 PM

Workstation Name: [id number]
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
## 2021-10-13 RX ADMIN — LOSARTAN POTASSIUM SCH MG: 50 TABLET, FILM COATED ORAL at 12:07

## 2021-10-13 RX ADMIN — Medication SCH ML: at 12:10

## 2021-10-13 RX ADMIN — Medication SCH ML: at 21:45

## 2021-10-13 RX ADMIN — PANTOPRAZOLE SODIUM SCH MG: 40 TABLET, DELAYED RELEASE ORAL at 12:07

## 2021-10-13 RX ADMIN — HEPARIN SODIUM SCH UNIT: 5000 INJECTION, SOLUTION INTRAVENOUS; SUBCUTANEOUS at 06:18

## 2021-10-13 RX ADMIN — INSULIN LISPRO SCH: 100 INJECTION, SOLUTION INTRAVENOUS; SUBCUTANEOUS at 23:54

## 2021-10-13 RX ADMIN — MORPHINE SULFATE PRN MG: 2 INJECTION, SOLUTION INTRAMUSCULAR; INTRAVENOUS at 15:44

## 2021-10-13 RX ADMIN — HEPARIN SODIUM SCH UNIT: 5000 INJECTION, SOLUTION INTRAVENOUS; SUBCUTANEOUS at 14:04

## 2021-10-13 RX ADMIN — INSULIN LISPRO SCH: 100 INJECTION, SOLUTION INTRAVENOUS; SUBCUTANEOUS at 16:34

## 2021-10-13 RX ADMIN — INSULIN LISPRO SCH: 100 INJECTION, SOLUTION INTRAVENOUS; SUBCUTANEOUS at 12:09

## 2021-10-13 RX ADMIN — HEPARIN SODIUM SCH UNIT: 5000 INJECTION, SOLUTION INTRAVENOUS; SUBCUTANEOUS at 21:42

## 2021-10-13 RX ADMIN — INSULIN LISPRO SCH: 100 INJECTION, SOLUTION INTRAVENOUS; SUBCUTANEOUS at 07:30
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
## 2021-10-13 NOTE — ULTRASOUND REPORT
ULTRASOUND ABDOMEN, LIMITED (RIGHT UPPER QUADRANT)



INDICATION:

abd pain cholelithiasis.



COMPARISON:

CT abdomen 10/12/2021



FINDINGS:

Pancreas: Visualized portion shows no significant abnormality.

Liver: Normal.

Gallbladder: Multiple gallstones

Bile ducts: Normal. Common Bile Duct measures 2 mm. 



Free fluid: None.

Additional Findings: None.



IMPRESSION:

1. Cholelithiasis

2. No other sonographic abnormality of the right upper quadrant.



Signer Name: Tony Olivo MD 

Signed: 10/13/2021 3:06 AM

Workstation Name: [email or web address] [id number]
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
## 2021-10-14 VITALS — DIASTOLIC BLOOD PRESSURE: 64 MMHG | SYSTOLIC BLOOD PRESSURE: 200 MMHG

## 2021-10-14 LAB
BASOPHILS # (AUTO): 0.1 K/MM3 (ref 0–0.1)
BASOPHILS NFR BLD AUTO: 0.6 % (ref 0–1.8)
BUN SERPL-MCNC: 18 MG/DL (ref 9–20)
BUN/CREAT SERPL: 20 %
CALCIUM SERPL-MCNC: 9.2 MG/DL (ref 8.4–10.2)
EOSINOPHIL # BLD AUTO: 0 K/MM3 (ref 0–0.4)
EOSINOPHIL NFR BLD AUTO: 0 % (ref 0–4.3)
HCT VFR BLD CALC: 42.2 % (ref 35.5–45.6)
HEMOLYSIS INDEX: 10
HGB BLD-MCNC: 14 GM/DL (ref 11.8–15.2)
INR PPP: 0.98 (ref 0.87–1.13)
LYMPHOCYTES # BLD AUTO: 1.2 K/MM3 (ref 1.2–5.4)
LYMPHOCYTES NFR BLD AUTO: 13.4 % (ref 13.4–35)
MCHC RBC AUTO-ENTMCNC: 33 % (ref 32–34)
MCV RBC AUTO: 86 FL (ref 84–94)
MONOCYTES # (AUTO): 0.7 K/MM3 (ref 0–0.8)
MONOCYTES % (AUTO): 7.6 % (ref 0–7.3)
PLATELET # BLD: 193 K/MM3 (ref 140–440)
RBC # BLD AUTO: 4.93 M/MM3 (ref 3.65–5.03)

## 2021-10-14 RX ADMIN — Medication SCH ML: at 12:24

## 2021-10-14 RX ADMIN — PANTOPRAZOLE SODIUM SCH MG: 40 TABLET, DELAYED RELEASE ORAL at 12:23

## 2021-10-14 RX ADMIN — MORPHINE SULFATE PRN MG: 2 INJECTION, SOLUTION INTRAMUSCULAR; INTRAVENOUS at 12:25

## 2021-10-14 RX ADMIN — INSULIN LISPRO SCH: 100 INJECTION, SOLUTION INTRAVENOUS; SUBCUTANEOUS at 09:12

## 2021-10-14 RX ADMIN — LOSARTAN POTASSIUM SCH MG: 50 TABLET, FILM COATED ORAL at 12:23

## 2021-10-14 RX ADMIN — HEPARIN SODIUM SCH UNIT: 5000 INJECTION, SOLUTION INTRAVENOUS; SUBCUTANEOUS at 06:15

## 2021-10-14 NOTE — PROGRESS NOTE
Assessment and Plan





80-year-old male with right upper quadrant pain.  Etiology is unclear however he

does have cholelithiasis and mild biliary dyskinesia.  Patient is overall a high

risk surgical candidate and does not desire to have surgery nor do I feel that 

having surgery is worth the risk for his current pathology.  Recommend treating 

patients abdominal pain as needed and nausea.  A tolerating a diet can be 

discharged.





Subjective


Date of service: 10/14/21


Narrative: 





No acute events overnight.  Patient says he continues to have right-sided pain 

however the pain extends from his right leg all at the right side of his face.  

He denies any nausea or vomiting.  Patient had a HIDA scan that was negative for

obstruction but showed mild biliary dyskinesia.  





Objective


                               Vital Signs - 12hr











  10/14/21 10/14/21





  04:00 05:16


 


Temperature  98.2 F


 


Pulse Rate  51 L


 


Respiratory 18 20





Rate  


 


Blood Pressure  199/64


 


O2 Sat by Pulse 96 98





Oximetry  














- General physical appearance


well developed, no distress





- ENT


no hearing loss





- Respiratory


normal expansion, normal respiratory effort





- Abdomen


other (soft, non distended, mild tenderness to deep palpation right subcostal 

area)





- Labs





                                 10/14/21 05:17





                                 10/14/21 05:17


                                 Diabetes panel











  10/14/21 Range/Units





  05:17 


 


Sodium  137  (137-145)  mmol/L


 


Potassium  4.0  (3.6-5.0)  mmol/L


 


Chloride  96.6 L  ()  mmol/L


 


Carbon Dioxide  23  (22-30)  mmol/L


 


BUN  18  (9-20)  mg/dL


 


Creatinine  0.9  (0.8-1.3)  mg/dL


 


Glucose  102 H  ()  mg/dL


 


Calcium  9.2  (8.4-10.2)  mg/dL








                                  Calcium panel











  10/14/21 Range/Units





  05:17 


 


Calcium  9.2  (8.4-10.2)  mg/dL








                                 Pituitary panel











  10/14/21 Range/Units





  05:17 


 


Sodium  137  (137-145)  mmol/L


 


Potassium  4.0  (3.6-5.0)  mmol/L


 


Chloride  96.6 L  ()  mmol/L


 


Carbon Dioxide  23  (22-30)  mmol/L


 


BUN  18  (9-20)  mg/dL


 


Creatinine  0.9  (0.8-1.3)  mg/dL


 


Glucose  102 H  ()  mg/dL


 


Calcium  9.2  (8.4-10.2)  mg/dL








                                  Adrenal panel











  10/14/21 Range/Units





  05:17 


 


Sodium  137  (137-145)  mmol/L


 


Potassium  4.0  (3.6-5.0)  mmol/L


 


Chloride  96.6 L  ()  mmol/L


 


Carbon Dioxide  23  (22-30)  mmol/L


 


BUN  18  (9-20)  mg/dL


 


Creatinine  0.9  (0.8-1.3)  mg/dL


 


Glucose  102 H  ()  mg/dL


 


Calcium  9.2  (8.4-10.2)  mg/dL

## 2021-10-14 NOTE — NUCLEAR MEDICINE REPORT
NUCLEAR MEDICINE HEPATOBILIARY SCAN



INDICATION:  abdominal pain RUQ gallstones on CT.



TECHNIQUE: 

Radiotracer: Tc-99m mebrofenin (by IV): mCi.

Gallbladder Stimulant: 1.3 mcg of Kinevac



FINDINGS:

Hepatic activity: Normal. 

Biliary activity: Normal. Common bile duct activity at 20 minutes.

Gallbladder activity: Delayed at 60 minutes.

Small bowel activity: Normal at 20 minutes.



The gallbladder ejection fraction is slightly decreased measuring 27%. The patient reports the sympto
ms were reproduced during the infusion of Kinevac.



IMPRESSION:

 No biliary obstruction.

Slightly decreased gallbladder ejection fraction.

Symptomatology as described.





Signer Name: Chad Dubon Jr, MD 

Signed: 10/14/2021 11:22 AM

Workstation Name: [id number]
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
## 2021-10-20 NOTE — ELECTROCARDIOGRAPH REPORT
[facility]

                                       

Test Date:    2021-10-12               Test Time:    17:16:57

Pat Name:     KIM HARRINGTON             Department:   

Patient ID:   [id number]          Room:         A390 1

Gender:       M                        Technician:   MARIA DEL CARMEN

:          1940               Requested By: MARY LANCE

Order Number: [id number]              Reading MD:   Emile Jack

                                 Measurements

Intervals                              Axis          

Rate:         57                       P:            57

MA:           225                      QRS:          -29

QRSD:         101                      T:            -17

QT:           391                                    

QTc:          382                                    

                           Interpretive Statements

Sinus bradycardia

Prolonged MA interval

Probable left atrial enlargement

Low voltage, extremity leads

Consider old anterior infarct

No previous ECG available for comparison

Electronically Signed On 10- 10:19:53 EDT by Emile Jack
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
## 2021-10-22 ENCOUNTER — HOSPITAL ENCOUNTER (EMERGENCY)
Dept: HOSPITAL 5 - ED | Age: 81
Discharge: HOME | End: 2021-10-22
Payer: MEDICARE

## 2021-10-22 VITALS — DIASTOLIC BLOOD PRESSURE: 65 MMHG | SYSTOLIC BLOOD PRESSURE: 184 MMHG

## 2021-10-22 DIAGNOSIS — E78.00: ICD-10-CM

## 2021-10-22 DIAGNOSIS — Z98.890: ICD-10-CM

## 2021-10-22 DIAGNOSIS — E11.8: ICD-10-CM

## 2021-10-22 DIAGNOSIS — Z86.73: ICD-10-CM

## 2021-10-22 DIAGNOSIS — H54.7: ICD-10-CM

## 2021-10-22 DIAGNOSIS — I10: ICD-10-CM

## 2021-10-22 DIAGNOSIS — K80.20: Primary | ICD-10-CM

## 2021-10-22 LAB
ALBUMIN SERPL-MCNC: 4.5 G/DL (ref 3.9–5)
ALT SERPL-CCNC: 15 UNITS/L (ref 7–56)
BAND NEUTROPHILS # (MANUAL): 0 K/MM3
BASOPHILS # (AUTO): 0.1 K/MM3 (ref 0–0.1)
BILIRUB DIRECT SERPL-MCNC: 0.2 MG/DL (ref 0–0.2)
BILIRUB UR QL STRIP: (no result)
BLOOD UR QL VISUAL: (no result)
BUN SERPL-MCNC: 14 MG/DL (ref 9–20)
BUN/CREAT SERPL: 14 %
CALCIUM SERPL-MCNC: 9.5 MG/DL (ref 8.4–10.2)
EOSINOPHIL # BLD AUTO: 0 K/MM3 (ref 0–0.4)
EOSINOPHIL NFR BLD AUTO: 0 % (ref 0–4.3)
HCT VFR BLD CALC: 44.9 % (ref 35.5–45.6)
HEMOLYSIS INDEX: 8
HGB BLD-MCNC: 14.6 GM/DL (ref 11.8–15.2)
MCHC RBC AUTO-ENTMCNC: 33 % (ref 32–34)
MCV RBC AUTO: 86 FL (ref 84–94)
MONOCYTES # (AUTO): 0.2 K/MM3 (ref 0–0.8)
MONOCYTES % (AUTO): 2.9 % (ref 0–7.3)
MYELOCYTES # (MANUAL): 0 K/MM3
PH UR STRIP: 8 [PH] (ref 5–7)
PLATELET # BLD: 189 K/MM3 (ref 140–440)
PROMYELOCYTES # (MANUAL): 0 K/MM3
RBC # BLD AUTO: 5.21 M/MM3 (ref 3.65–5.03)
RBC #/AREA URNS HPF: 1 /HPF (ref 0–6)
TOTAL CELLS COUNTED BLD: 100
UROBILINOGEN UR-MCNC: < 2 MG/DL (ref ?–2)
WBC #/AREA URNS HPF: < 1 /HPF (ref 0–6)

## 2021-10-22 PROCEDURE — 85025 COMPLETE CBC W/AUTO DIFF WBC: CPT

## 2021-10-22 PROCEDURE — 96374 THER/PROPH/DIAG INJ IV PUSH: CPT

## 2021-10-22 PROCEDURE — 81001 URINALYSIS AUTO W/SCOPE: CPT

## 2021-10-22 PROCEDURE — 83690 ASSAY OF LIPASE: CPT

## 2021-10-22 PROCEDURE — 74177 CT ABD & PELVIS W/CONTRAST: CPT

## 2021-10-22 PROCEDURE — 80048 BASIC METABOLIC PNL TOTAL CA: CPT

## 2021-10-22 PROCEDURE — 96376 TX/PRO/DX INJ SAME DRUG ADON: CPT

## 2021-10-22 PROCEDURE — 85007 BL SMEAR W/DIFF WBC COUNT: CPT

## 2021-10-22 PROCEDURE — 96375 TX/PRO/DX INJ NEW DRUG ADDON: CPT

## 2021-10-22 PROCEDURE — 36415 COLL VENOUS BLD VENIPUNCTURE: CPT

## 2021-10-22 PROCEDURE — 80076 HEPATIC FUNCTION PANEL: CPT

## 2021-10-22 PROCEDURE — 99284 EMERGENCY DEPT VISIT MOD MDM: CPT
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
## 2021-10-22 NOTE — CAT SCAN REPORT
CT ABDOMEN AND PELVIS WITH CONTRAST



INDICATION:

R flank pain OMNI 300 100 ML.



TECHNIQUE:

Axial CT images were obtained through the abdomen and pelvis after 100 cc IV contrast.  All CT scans 
at this location are performed using CT dose reduction for ALARA by means of automated exposure contr
ol. 



COMPARISON:

None available.



FINDINGS:

LOWER CHEST: No significant abnormality.

LIVER: No significant abnormality.

GALLBLADDER: Multiple small layering calcified gallstones, unchanged. No CT evidence for cholecystiti
s  

BILE DUCTS: No significant abnormality.

PANCREAS: No significant abnormality.

SPLEEN: No significant abnormality.

ADRENALS: No significant abnormality.

RIGHT KIDNEY and URETER: No significant abnormality.

LEFT KIDNEY and URETER: No significant abnormality.



STOMACH and SMALL BOWEL: No significant abnormality. 

COLON: Moderate amount of solid stool. 

APPENDIX: No significant abnormality.  

PERITONEUM: No free fluid. No free air. No fluid collection.

LYMPH NODES: No significant adenopathy.

AORTA and ARTERIES: Large amount of atherosclerotic plaque with occlusion of both common femoral and 
proximal superficial femoral arteries, unchanged. 

IVC and VEINS: No significant abnormality.



URINARY BLADDER: No significant abnormality.

REPRODUCTIVE ORGANS: Enlarged heterogeneous prostate, unchanged.



ADDITIONAL FINDINGS: None.



SKELETAL SYSTEM: No significant abnormality.



IMPRESSION:

1. Cholelithiasis

2. 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
rteries, unchanged



Signer Name: Tony Olivo MD 

Signed: 10/22/2021 2:51 PM

Workstation Name: [id number]

## 2022-03-20 ENCOUNTER — HOSPITAL ENCOUNTER (EMERGENCY)
Dept: HOSPITAL 5 - ED | Age: 82
Discharge: HOME | End: 2022-03-20
Payer: MEDICARE

## 2022-03-20 VITALS — DIASTOLIC BLOOD PRESSURE: 68 MMHG | SYSTOLIC BLOOD PRESSURE: 150 MMHG

## 2022-03-20 DIAGNOSIS — R10.84: Primary | ICD-10-CM

## 2022-03-20 DIAGNOSIS — I10: ICD-10-CM

## 2022-03-20 DIAGNOSIS — Z86.73: ICD-10-CM

## 2022-03-20 DIAGNOSIS — Z98.890: ICD-10-CM

## 2022-03-20 DIAGNOSIS — K59.00: ICD-10-CM

## 2022-03-20 DIAGNOSIS — E11.9: ICD-10-CM

## 2022-03-20 LAB
ALBUMIN SERPL-MCNC: 4.3 G/DL (ref 3.9–5)
ALT SERPL-CCNC: 15 UNITS/L (ref 7–56)
BASOPHILS # (AUTO): 0 K/MM3 (ref 0–0.1)
BASOPHILS NFR BLD AUTO: 0.3 % (ref 0–1.8)
BILIRUB DIRECT SERPL-MCNC: 0.3 MG/DL (ref 0–0.2)
BILIRUB UR QL STRIP: (no result)
BLOOD UR QL VISUAL: (no result)
BUN SERPL-MCNC: 19 MG/DL (ref 9–20)
BUN/CREAT SERPL: 17 %
CALCIUM SERPL-MCNC: 9.7 MG/DL (ref 8.4–10.2)
EOSINOPHIL # BLD AUTO: 0 K/MM3 (ref 0–0.4)
EOSINOPHIL NFR BLD AUTO: 0.1 % (ref 0–4.3)
HCT VFR BLD CALC: 44.3 % (ref 35.5–45.6)
HEMOLYSIS INDEX: 11
HGB BLD-MCNC: 14.1 GM/DL (ref 11.8–15.2)
LYMPHOCYTES # BLD AUTO: 0.6 K/MM3 (ref 1.2–5.4)
LYMPHOCYTES NFR BLD AUTO: 5.8 % (ref 13.4–35)
MCHC RBC AUTO-ENTMCNC: 32 % (ref 32–34)
MCV RBC AUTO: 86 FL (ref 84–94)
MONOCYTES # (AUTO): 0.8 K/MM3 (ref 0–0.8)
MONOCYTES % (AUTO): 7.5 % (ref 0–7.3)
PH UR STRIP: 5 [PH] (ref 5–7)
PLATELET # BLD: 189 K/MM3 (ref 140–440)
RBC # BLD AUTO: 5.16 M/MM3 (ref 3.65–5.03)
RBC #/AREA URNS HPF: < 1 /HPF (ref 0–6)
UROBILINOGEN UR-MCNC: < 2 MG/DL (ref ?–2)
WBC #/AREA URNS HPF: < 1 /HPF (ref 0–6)

## 2022-03-20 PROCEDURE — 80076 HEPATIC FUNCTION PANEL: CPT

## 2022-03-20 PROCEDURE — 80048 BASIC METABOLIC PNL TOTAL CA: CPT

## 2022-03-20 PROCEDURE — 85025 COMPLETE CBC W/AUTO DIFF WBC: CPT

## 2022-03-20 PROCEDURE — 81001 URINALYSIS AUTO W/SCOPE: CPT

## 2022-03-20 PROCEDURE — 74177 CT ABD & PELVIS W/CONTRAST: CPT

## 2022-03-20 PROCEDURE — 99284 EMERGENCY DEPT VISIT MOD MDM: CPT

## 2022-03-20 PROCEDURE — 36415 COLL VENOUS BLD VENIPUNCTURE: CPT
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
